# Patient Record
(demographics unavailable — no encounter records)

---

## 2024-10-24 NOTE — DISCUSSION/SUMMARY
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  She believes that she may have injured the finger several weeks ago.  I had a discussion with the patient regarding today's visit, the prognosis of this diagnosis, and treatment recommendations and options. At this time, as her symptoms are mild, I recommended observation paired with use of a warm compress. If her symptoms continue to cause her discomfort or worsen, she will return to my office in 4 weeks.   The patient has agreed to the above plan of management and has expressed full understanding.  All questions were fully answered to the patient's satisfaction.  My cumulative time spent on this visit included: Preparation for the visit, review of the medical records, review of pertinent diagnostic studies, examination and counseling of the patient on the above diagnosis, treatment plan and prognosis, orders of diagnostic tests, medication and/or appropriate procedures and documentation in the medical records of today's visit.

## 2024-10-24 NOTE — PHYSICAL EXAM
[de-identified] : - Constitutional: This is a female in no obvious distress.   - Psych: Patient is alert and oriented to person, place and time.  Patient has a normal mood and affect.  - Cardiovascular: Normal pulses throughout the upper extremities.  No significant varicosities are noted in the upper extremities.  - Neuro: Strength and sensation are intact throughout the upper extremities.  Patient has normal coordination.   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  Examination of her left ring finger demonstrates a prominent capillary hemangioma palmarly overlying the PIP joint.  It is tender.  It is nonpulsatile.  She is neurovascularly intact distally.

## 2024-10-24 NOTE — ADDENDUM
[FreeTextEntry1] :  I, Price Sarabia, acted solely as a scribe for Dr. Golden on this date on 10/24/2024.

## 2024-10-24 NOTE — END OF VISIT
[FreeTextEntry3] : This note was written by Price Sarabia on 10/24/2024 acting solely as a scribe for Dr. Champ Golden.   All medical record entries made by the Scribe were at my, Dr. Champ Golden, direction and personally dictated by me on 10/24/2024. I have personally reviewed the chart and agree that the record accurately reflects my personal performance of the history, physical exam, assessment and plan.

## 2024-10-24 NOTE — HISTORY OF PRESENT ILLNESS
[Right] : right hand dominant [FreeTextEntry1] : She comes in today for evaluation of left ring finger swelling with began a few weeks ago with no known injury. She rates her pain as a 0/10, which worsens with pressure. She denies numbness, tingling, clicking, or locking.   She has sciatica.  She is currently taking Gabapentin.

## 2024-12-10 NOTE — ASSESSMENT
[FreeTextEntry1] : The patient was advised of the diagnosis. The natural history of the pathology was explained in full to the patient in layman's terms. All questions were answered. The risks and benefits of surgical and non-surgical treatment alternatives were explained in full to the patient.  I discussed repeat injection and surgery  She declines Will cont with therapy  Observe mass REturn prn

## 2024-12-10 NOTE — PHYSICAL EXAM
[de-identified] : R hand:  Mild swelling  Tender 4th A1 pulley  Decreased ring ROM  +ring triggering  L RF  Small palp mass at the palmar PIP Nontender FROM

## 2024-12-10 NOTE — HISTORY OF PRESENT ILLNESS
[1] : 2 [0] : 0 [Dull/Aching] : dull/aching [de-identified] : L RF mass at the PIP palmar  R RF trigger- doing OT She had injection with very minimal relief [] : no [FreeTextEntry1] : L hand

## 2025-02-11 NOTE — CONSULT LETTER
[FreeTextEntry1] : Dear Dr. Yves Ying,   I had the pleasure of seeing your patient ALIVIA MAX in the office today.  My office note is attached. PLEASE READ THE "ASSESSMENT" SECTION OF THE NOTE TO SEE MY IMPRESSION AND PLAN.   Thank you very much for allowing me to participate in the care of your patient.   Sincerely,   Silverio Del Castillo M.D., FAC, FACP Director, Celiac Program at [facility]  of Medicine, [facility] [employer] [facility] at [facility] Adjunct  of Medicine, [facility] Practice Director, [facility] - Gastroenterology at [address] - [address] [place] [address] Tel: [phone] Email: [email or web address]     The attached note has been created using a voice recognition system (Dragon).  There may be some misspellings and typos.  Please call my office if you have any issues or questions.

## 2025-02-11 NOTE — ASSESSMENT
[FreeTextEntry1] : Patient with an episode of rectal bleeding.  She currently has guaiac negative brown stool.  She does have hemorrhoids which are the likely etiology of the bleeding.  She also has complaints of abdominal bloating and distention and postprandial abdominal discomfort.  We discussed pursuing EGD and colonoscopy, as it is almost 5 years since these exams were done, given the patient's symptoms.  The patient does not wish to pursue this at this time but will contact me if she has further bleeding.  Bloodwork was sent for CBC, chem-pack, TSH, celiac markers, iron studies.  Patient was sent for an abdominal ultrasound.

## 2025-02-11 NOTE — HISTORY OF PRESENT ILLNESS
[FreeTextEntry1] : The patient is a 73-year-old woman who saw a bright red blood on the stool and in the water earlier today.  This is the first time she has seen blood.  She generally has 2-3 solid bowel movements a day.  She denies melena.  She also has noted abdominal bloating and distention for the past year.  She denies heartburn or dysphagia.  She gets infrequent nausea but denies vomiting.  She also gets postprandial abdominal discomfort.  The patient's weight is stable.  She last had a EGD and colonoscopy by Dr. Hernandez on June 18, 2020.  EGD was significant for a small hiatal hernia with no significant findings on endoscopic biopsies.  Colonoscopy was significant for sigmoid diverticulosis and internal hemorrhoids.  The patient has not been admitted to the hospital in the past year and denies any cardiac issues.

## 2025-04-22 NOTE — ASSESSMENT
[FreeTextEntry1] : Patient with abdominal distention with no significant findings on ultrasound or MRI.  She moves her bowels but still is concerned that her bowel movements are incomplete.  I had a long discussion with the patient regarding the symptoms.  I advised that the best way to have complete bowel movements is with increased fiber and water.  We discussed fiber supplements.  As her last colonoscopy was 5 years ago, we discussed colonoscopy.  The patient wishes to think about it.  Information was given to the patient regarding diverticulosis and a high-fiber diet.  We discussed this in depth.   Plan from 2/11/2025 - Patient with an episode of rectal bleeding.  She currently has guaiac negative brown stool.  She does have hemorrhoids which are the likely etiology of the bleeding.  She also has complaints of abdominal bloating and distention and postprandial abdominal discomfort.  We discussed pursuing EGD and colonoscopy, as it is almost 5 years since these exams were done, given the patient's symptoms.  The patient does not wish to pursue this at this time but will contact me if she has further bleeding.  Bloodwork was sent for CBC, chem-pack, TSH, celiac markers, iron studies.  Patient was sent for an abdominal ultrasound.
